# Patient Record
Sex: FEMALE | Race: WHITE | NOT HISPANIC OR LATINO
[De-identification: names, ages, dates, MRNs, and addresses within clinical notes are randomized per-mention and may not be internally consistent; named-entity substitution may affect disease eponyms.]

---

## 2018-07-27 ENCOUNTER — APPOINTMENT (OUTPATIENT)
Dept: RHEUMATOLOGY | Facility: CLINIC | Age: 53
End: 2018-07-27
Payer: COMMERCIAL

## 2018-07-27 VITALS
HEIGHT: 63 IN | WEIGHT: 147 LBS | HEART RATE: 65 BPM | TEMPERATURE: 98.3 F | DIASTOLIC BLOOD PRESSURE: 72 MMHG | BODY MASS INDEX: 26.05 KG/M2 | OXYGEN SATURATION: 98 % | SYSTOLIC BLOOD PRESSURE: 117 MMHG | RESPIRATION RATE: 18 BRPM

## 2018-07-27 DIAGNOSIS — M25.50 PAIN IN UNSPECIFIED JOINT: ICD-10-CM

## 2018-07-27 DIAGNOSIS — M79.1 MYALGIA: ICD-10-CM

## 2018-07-27 DIAGNOSIS — E06.3 AUTOIMMUNE THYROIDITIS: ICD-10-CM

## 2018-07-27 DIAGNOSIS — H04.123 DRY EYE SYNDROME OF BILATERAL LACRIMAL GLANDS: ICD-10-CM

## 2018-07-27 DIAGNOSIS — Z87.891 PERSONAL HISTORY OF NICOTINE DEPENDENCE: ICD-10-CM

## 2018-07-27 DIAGNOSIS — F32.9 MAJOR DEPRESSIVE DISORDER, SINGLE EPISODE, UNSPECIFIED: ICD-10-CM

## 2018-07-27 DIAGNOSIS — M79.7 FIBROMYALGIA: ICD-10-CM

## 2018-07-27 PROCEDURE — 36415 COLL VENOUS BLD VENIPUNCTURE: CPT

## 2018-07-27 PROCEDURE — 99204 OFFICE O/P NEW MOD 45 MIN: CPT | Mod: 25

## 2018-07-27 RX ORDER — BUPROPION HYDROCHLORIDE 300 MG/1
300 TABLET, EXTENDED RELEASE ORAL
Refills: 0 | Status: ACTIVE | COMMUNITY

## 2018-07-27 RX ORDER — GABAPENTIN 100 MG/1
100 CAPSULE ORAL 3 TIMES DAILY
Qty: 90 | Refills: 1 | Status: ACTIVE | COMMUNITY
Start: 2018-07-27 | End: 1900-01-01

## 2018-07-27 RX ORDER — LEVOTHYROXINE SODIUM 0.17 MG/1
175 TABLET ORAL
Refills: 0 | Status: ACTIVE | COMMUNITY

## 2018-07-27 RX ORDER — DULOXETINE HYDROCHLORIDE 60 MG/1
60 CAPSULE, DELAYED RELEASE ORAL
Refills: 0 | Status: ACTIVE | COMMUNITY

## 2018-07-27 RX ORDER — CHROMIUM 200 MCG
TABLET ORAL
Refills: 0 | Status: ACTIVE | COMMUNITY

## 2018-07-27 RX ORDER — BUPROPION HYDROCHLORIDE 150 MG/1
150 TABLET, EXTENDED RELEASE ORAL
Refills: 0 | Status: ACTIVE | COMMUNITY

## 2018-08-07 PROBLEM — F32.9 DEPRESSION: Status: ACTIVE | Noted: 2018-07-27

## 2018-08-07 PROBLEM — E06.3 HYPOTHYROIDISM, ACQUIRED, AUTOIMMUNE: Status: RESOLVED | Noted: 2018-07-27 | Resolved: 2018-08-07

## 2018-09-06 ENCOUNTER — APPOINTMENT (OUTPATIENT)
Dept: RHEUMATOLOGY | Facility: CLINIC | Age: 53
End: 2018-09-06

## 2018-09-28 ENCOUNTER — APPOINTMENT (OUTPATIENT)
Dept: RHEUMATOLOGY | Facility: CLINIC | Age: 53
End: 2018-09-28

## 2019-01-24 LAB
A PHAGOCYTOPH IGG TITR SER IF: ABNORMAL TITER
ANA SER IF-ACNC: NEGATIVE
B BURGDOR AB SER QL IA: NEGATIVE
B MICROTI IGG TITR SER: NORMAL TITER
CA VI IGA AB: 4.4 EU/ML
CA VI IGG AB: 14.7 EU/ML
CA VI IGM AB: 3.4 EU/ML
CK SERPL-CCNC: 67 U/L
COLLAGEN TYPE II: 6.9 EU/ML
CRP SERPL-MCNC: <0.1 MG/DL
E CHAFFEENSIS IGG TITR SER IF: ABNORMAL TITER
ENA SS-A AB SER IA-ACNC: <0.2 AL
ENA SS-B AB SER IA-ACNC: <0.2 AL
HLA-B27 RELATED AG QL: NORMAL
PSP IGA AB: 21 EU/ML
PSP IGG AB: 3.3 EU/ML
PSP IGM AB: 2.4 EU/ML
R RICKETTSI IGG CSF-ACNC: NEGATIVE
R RICKETTSI IGM CSF-ACNC: 0.21 INDEX
RHEUMATOID FACTOR IDENTRA: NORMAL
SEROLOGY COMMENTS: NORMAL
SP-1 IGA AB: NORMAL
SP-1 IGG AB: 3.7 EU/ML
SP-1 IGM AB: NORMAL
URATE SERPL-MCNC: 3.9 MG/DL

## 2020-08-03 ENCOUNTER — APPOINTMENT (OUTPATIENT)
Dept: RHEUMATOLOGY | Facility: CLINIC | Age: 55
End: 2020-08-03

## 2021-01-14 ENCOUNTER — RESULT REVIEW (OUTPATIENT)
Age: 56
End: 2021-01-14

## 2023-02-03 ENCOUNTER — APPOINTMENT (OUTPATIENT)
Dept: ORTHOPEDIC SURGERY | Facility: CLINIC | Age: 58
End: 2023-02-03
Payer: COMMERCIAL

## 2023-02-03 VITALS — BODY MASS INDEX: 35.51 KG/M2 | WEIGHT: 208 LBS | HEIGHT: 64 IN

## 2023-02-03 DIAGNOSIS — M77.10 LATERAL EPICONDYLITIS, UNSPECIFIED ELBOW: ICD-10-CM

## 2023-02-03 DIAGNOSIS — Z78.9 OTHER SPECIFIED HEALTH STATUS: ICD-10-CM

## 2023-02-03 PROCEDURE — 99214 OFFICE O/P EST MOD 30 MIN: CPT

## 2023-02-03 PROCEDURE — 73080 X-RAY EXAM OF ELBOW: CPT | Mod: LT

## 2023-02-03 NOTE — ASSESSMENT
[FreeTextEntry1] : Left Lateral Epicondylitis\par The diagnosis of lateral epicondylitis and treatment options were discussed at length with the patient today.  I discussed that in terms of the natural history of the condition, it can sometimes take many months to improve.  I discussed treatment options including observation, activity modification including avoiding lifting with the hand pronated and instead lifting with the hand supinated, oral anti-inflammatories, hand therapy, counterforce brace, and steroid injection.  I discussed that for the steroid injection, the literature proves this to be no better than a placebo, however, it is still a potential option for the patient.  Furthermore, if this condition is recalcitrant, I discussed obtaining an MRI scan to assess both the lateral epicondyle as well as the radiocapitellar joint and specifically for a potential plica that could be impinging on the radiocapitellar joint. All of the patient's questions were answered to their satisfaction.\par \par F/u prn

## 2023-02-03 NOTE — IMAGING
[de-identified] : LEFT ELBOW EXAM\par +ttp at lateral epicondyle, worse with resisted wrist extension\par Skin intact\par No deformity, edema, or ecchymosis\par Hand with brisk capillary refill, warm and well perfused\par Motor function intact to AIN, PIN, ulnar nerves\par Sensation intact median, ulnar, radial nerves\par Elbow ROM\par   Flexion 135°\par   Extension to 0°\par   Supination 85°\par   Pronation 85°\par No elbow instability noted\par Strength for elbow flexion & extension is 5/5\par Hand and wrist motion is intact and full\par Patient able to make a composite fist\par \par Left elbow radiographs with no fracture nor dislocation.

## 2023-02-03 NOTE — HISTORY OF PRESENT ILLNESS
[de-identified] : 57F, RHD, PMHX of Thyroid Disease presents with left elbow pain since mid January 2023. Patient rpeorts came on suddenly, certain movements worse than others. Admits to having pain, difficulty holding objects - especially heavier ones. Denies outside imaging/treatment. Denies numbness/tingliong.

## 2023-07-26 ENCOUNTER — APPOINTMENT (OUTPATIENT)
Dept: ORTHOPEDIC SURGERY | Facility: CLINIC | Age: 58
End: 2023-07-26
Payer: COMMERCIAL

## 2023-07-26 VITALS — HEIGHT: 64 IN | WEIGHT: 200 LBS | BODY MASS INDEX: 34.15 KG/M2

## 2023-07-26 DIAGNOSIS — S59.909A UNSPECIFIED INJURY OF UNSPECIFIED ELBOW, INITIAL ENCOUNTER: ICD-10-CM

## 2023-07-26 PROCEDURE — 99214 OFFICE O/P EST MOD 30 MIN: CPT

## 2023-07-26 NOTE — HISTORY OF PRESENT ILLNESS
[de-identified] : 57F, RHD, PMHX of Thyroid Disease presents with left elbow pain since mid January 2023. Patient [id number]/ came on suddenly, certain movements worse than others. Admits to having pain, difficulty holding objects - especially heavier ones. Denies outside imaging/treatment. Denies numbness/tingling.\par \par ** NEW COMPLAINT ** \par \par 07/26/23: presents w/ left arm injury s/p fall yesterday. Patient was walking her dog when she lost her footing and fell, landing on her left arm. The pain is constant and aggravated with movement. Patient went to [facility] 07/26/23 and received an X-ray, advised of a fracture of the radial head. Patient taking Advil PRN with minimal relief. Denies numbness/tingling.

## 2023-07-26 NOTE — IMAGING
[de-identified] : Left elbow with minimal ecchymosis, +ttp at radial head, -ttp at medial epicondyle, olecranon. Supination/pronation to 90/60. Elbow arc from 20 to 125. Able to make fist, oppose thumb to small finger and abduct fingers. Sensation intact throughout. <2sec cap refill.\par \par Left elbow radiographs with nondisplaced radial neck fracture; concentric radiocapitellar and ulnohumeral joints.

## 2023-07-26 NOTE — ASSESSMENT
[FreeTextEntry1] : Left radial head fracture, minimally displaced - will manage with closed management\par \par Reviewed radiographs with patient and discussed pathoanatomy. Discussed alignment is within acceptable parameters to manage with closed management. Discussed risk of stiffness, late tendon injury, and displacement requiring operative intervention. NWB, elevate, NSAIDs prn.\par \par Discussed need to cease splinting and cease sling. Begin ROM. Will remain NWB through about 6 weeks post injury.\par \par F/u 4weeks; repeat films

## 2023-08-29 ENCOUNTER — APPOINTMENT (OUTPATIENT)
Dept: ORTHOPEDIC SURGERY | Facility: CLINIC | Age: 58
End: 2023-08-29

## 2024-08-19 ENCOUNTER — APPOINTMENT (OUTPATIENT)
Dept: ORTHOPEDIC SURGERY | Facility: CLINIC | Age: 59
End: 2024-08-19
Payer: COMMERCIAL

## 2024-08-19 DIAGNOSIS — G56.02 CARPAL TUNNEL SYNDROME, LEFT UPPER LIMB: ICD-10-CM

## 2024-08-19 PROCEDURE — 99214 OFFICE O/P EST MOD 30 MIN: CPT

## 2024-08-20 PROBLEM — G56.02 CARPAL TUNNEL SYNDROME OF LEFT WRIST: Status: ACTIVE | Noted: 2024-08-19

## 2024-08-20 NOTE — ASSESSMENT
[FreeTextEntry1] : Left CTS - reviewed pathoanatomy. Encouraged nighttime cockup wrist bracing. Will obtain LUE EMG/NCV in light of severity of symptoms - patient will follow-up thereafter to discuss results and develop plan-of-care.  F/u after EMG/NCV

## 2024-08-20 NOTE — IMAGING
[de-identified] : Left wrist with no swelling nor erythema. Able to make fist, oppose thumb to small finger and abduct fingers, no overt atrophy. +Phalen's, +tinel at carpal, -tinel at [place], -tinel at cubital. -froment, -wartenberg. Intact sensation in median and intact at superficial radial and intact in small and ulnar ring finger(normal at ulnar hand) prior to provocative testing. <2sec cap refill.

## 2024-08-20 NOTE — HISTORY OF PRESENT ILLNESS
[de-identified] : 57F, RHD, PMHX of Thyroid Disease presents with left elbow pain since mid January 2023. Patient [id number]/ came on suddenly, certain movements worse than others. Admits to having pain, difficulty holding objects - especially heavier ones. Denies outside imaging/treatment. Denies numbness/tingling.  ** NEW COMPLAINT **   07/26/23: presents w/ left arm injury s/p fall yesterday. Patient was walking her dog when she lost her footing and fell, landing on her left arm. The pain is constant and aggravated with movement. Patient went to [facility] 07/26/23 and received an X-ray, advised of a fracture of the radial head. Patient taking Advil PRN with minimal relief. Denies numbness/tingling.   ***NEW INJURY*** 08/19/24: Patient presents with left hand pain onset approx. April 2024. Patient reports that her hand has been going numb without any precipitating factors. Patient reports that there is no pain associated with it. Denies any recent nerve tests.

## 2024-08-27 ENCOUNTER — APPOINTMENT (OUTPATIENT)
Dept: NEUROLOGY | Facility: CLINIC | Age: 59
End: 2024-08-27

## 2024-09-09 ENCOUNTER — APPOINTMENT (OUTPATIENT)
Dept: ORTHOPEDIC SURGERY | Facility: CLINIC | Age: 59
End: 2024-09-09